# Patient Record
(demographics unavailable — no encounter records)

---

## 2025-05-06 NOTE — HISTORY OF PRESENT ILLNESS
[FreeTextEntry1] : annual exam  [de-identified] : annual exam  PMHx of insomnia, osteoporosis,  gets prolia injection q6 months  insomnia- stable on Ambien

## 2025-05-06 NOTE — HEALTH RISK ASSESSMENT
[Never (0 pts)] : Never (0 points) [0] : 2) Feeling down, depressed, or hopeless: Not at all (0) [Fully functional (bathing, dressing, toileting, transferring, walking, feeding)] : Fully functional (bathing, dressing, toileting, transferring, walking, feeding) [Fully functional (using the telephone, shopping, preparing meals, housekeeping, doing laundry, using] : Fully functional and needs no help or supervision to perform IADLs (using the telephone, shopping, preparing meals, housekeeping, doing laundry, using transportation, managing medications and managing finances) [Smoke Detector] : smoke detector [Carbon Monoxide Detector] : carbon monoxide detector [Seat Belt] :  uses seat belt [Sunscreen] : uses sunscreen [Good] : ~his/her~  mood as  good [No] : In the past 12 months have you used drugs other than those required for medical reasons? No [No falls in past year] : Patient reported no falls in the past year [Former] : Former [Patient declined mammogram] : Patient declined mammogram [Patient declined PAP Smear] : Patient declined PAP Smear [Patient reported bone density results were normal] : Patient reported bone density results were normal [Patient reported colonoscopy was normal] : Patient reported colonoscopy was normal [HIV test declined] : HIV test declined [Hepatitis C test declined] : Hepatitis C test declined [Reports changes in hearing] : Reports changes in hearing [PHQ-2 Negative - No further assessment needed] : PHQ-2 Negative - No further assessment needed [> 15 Years] : > 15 Years [FreeTextEntry1] : Renewal of pain medication for right shoulder  [de-identified] : no [de-identified] : no [Audit-CScore] : 0 [de-identified] : walking and yoga [de-identified] : healthy [[id number]] : 0 [de-identified] : Teenager  [Change in mental status noted] : No change in mental status noted [Reports changes in vision] : Reports no changes in vision [Reports changes in dental health] : Reports no changes in dental health [BoneDensityDate] : 07/14/2023 [BoneDensityComments] : Osteopetrosis (discuss with PT)  [ColonoscopyDate] : 06/10/2019 [de-identified] : Some hearing loss

## 2025-05-06 NOTE — ASSESSMENT
[Vaccines Reviewed] : Immunizations reviewed today. Please see immunization details in the vaccine log within the immunization flowsheet.  [FreeTextEntry1] : annual exam  PMHx of insomnia, osteoporosis,  gets prolia injection q6 months due for dexa in july 2025  insomnia- stable on Ambien  Blood work drawn in office today